# Patient Record
Sex: FEMALE | Race: WHITE | NOT HISPANIC OR LATINO | Employment: FULL TIME | URBAN - METROPOLITAN AREA
[De-identification: names, ages, dates, MRNs, and addresses within clinical notes are randomized per-mention and may not be internally consistent; named-entity substitution may affect disease eponyms.]

---

## 2017-09-06 ENCOUNTER — TELEPHONE (OUTPATIENT)
Dept: MEDICAL GROUP | Facility: PHYSICIAN GROUP | Age: 62
End: 2017-09-06

## 2017-09-06 NOTE — TELEPHONE ENCOUNTER
Patient called and left a . Called and spoke with patient. Patient wants a letter from  stating that for medical reason she needs to move back home. Patient said her lease to her home is not up until November. Home owner will let her break that lease and move out in October only if she has a medical letter stating it. Patient said she is moving back home to [place]. Patient said this would help her financially if she can move out a month early. Patient said due to all her surgery's here she doesn't have much help in her recovery. Patient has family in [place] that are going to help her with what she's going threw.I let patient know I would send  this message and let her know her response to.

## 2021-03-03 DIAGNOSIS — Z23 NEED FOR VACCINATION: ICD-10-CM
